# Patient Record
Sex: MALE | URBAN - METROPOLITAN AREA
[De-identification: names, ages, dates, MRNs, and addresses within clinical notes are randomized per-mention and may not be internally consistent; named-entity substitution may affect disease eponyms.]

---

## 2017-10-23 NOTE — PATIENT DISCUSSION
Monitor.   Patient to consider RLE OD vs. iLasik OD.  Patient informed RLE may be better option due to age.

## 2021-12-03 ENCOUNTER — IMPORTED ENCOUNTER (OUTPATIENT)
Dept: URBAN - METROPOLITAN AREA CLINIC 31 | Facility: CLINIC | Age: 76
End: 2021-12-03

## 2021-12-03 PROBLEM — H25.13: Noted: 2021-12-03

## 2021-12-03 PROBLEM — H04.213: Noted: 2021-12-03

## 2021-12-03 PROBLEM — H10.403: Noted: 2021-12-03

## 2021-12-03 PROCEDURE — 68801 DILATE TEAR DUCT OPENING: CPT

## 2021-12-03 PROCEDURE — 99203 OFFICE O/P NEW LOW 30 MIN: CPT

## 2021-12-03 NOTE — PATIENT DISCUSSION
1.  Epiphora OU - The patient is complaining of excess lacrimation of both eyes. Because it is relatively mild and not constant no diagnostic or therapeutic intervention was recommended at this time. 2. Allergic Conjunctivitis OU -- The condition was  discussed with the patient. Avoidance of allergens and cool compresses were recommended. 3. Nuclear Sclerotic Cataract OU: Explained how cataracts can effect vision. Recommend clinical observation. The patient was advised to contact us if any change or worsening of vision. Start Pred qid OU 1 week OC

## 2021-12-10 ENCOUNTER — IMPORTED ENCOUNTER (OUTPATIENT)
Dept: URBAN - METROPOLITAN AREA CLINIC 31 | Facility: CLINIC | Age: 76
End: 2021-12-10

## 2021-12-10 PROBLEM — H25.13: Noted: 2021-12-10

## 2021-12-10 PROBLEM — H10.403: Noted: 2021-12-10

## 2021-12-10 PROBLEM — H04.213: Noted: 2021-12-10

## 2021-12-10 PROCEDURE — 99213 OFFICE O/P EST LOW 20 MIN: CPT

## 2021-12-10 NOTE — PATIENT DISCUSSION
1. rESOLVED  Epiphora OU - The patient is complaining of excess lacrimation of both eyes. Because it is relatively mild and not constant no diagnostic or therapeutic intervention was recommended at this time. 2.  resolved Allergic Conjunctivitis OU -- The condition was  discussed with the patient. Avoidance of allergens and cool compresses were recommended. 3. Nuclear Sclerotic Cataract OU: Explained how cataracts can effect vision. Recommend clinical observation. The patient was advised to contact us if any change or worsening of vision. Stop Pred qid OU Can schedule cataract eval in  with testingCE 1 year

## 2022-04-01 ASSESSMENT — VISUAL ACUITY
OU_SC: J716''
OS_CC: 20/40
OS_CC: 20/40
OD_SC: J7
OD_SC: 20/30+2
OS_SC: J7
OD_CC: 20/40
OD_CC: 20/40
OS_SC: 20/25

## 2022-04-01 ASSESSMENT — TONOMETRY
OS_IOP_MMHG: 20
OD_IOP_MMHG: 19